# Patient Record
(demographics unavailable — no encounter records)

---

## 2025-03-18 NOTE — ASSESSMENT
[FreeTextEntry1] : 64 yr old male patient with a PMHx of choledochal cyst which was being monitored by Dr Bolton presented for follow up evaluation. Patient has no new complaints at this time, weight not stable since stopping tirzepatide.  Patient usually follows up with EGD/EUS to assess stability of cyst.   1. Choledochal cyst:  - Schedule EGD/EUS for evaluation of Choledochal cyst for growth - discussed r/b/a/i  - Follow up post procedure  - obtain record from Dr Bolton's clinic  - hold tirzepatide for 2 weeks before EUS.  - preop labs ordered

## 2025-03-18 NOTE — PHYSICAL EXAM
[Normal] : heart rate was normal and rhythm regular, normal S1 and S2, no murmurs [Bowel Sounds] : normal bowel sounds [Abdomen Tenderness] : non-tender [No Masses] : no abdominal mass palpated [Abdomen Soft] : soft [Oriented To Time, Place, And Person] : oriented to person, place, and time

## 2025-03-18 NOTE — HISTORY OF PRESENT ILLNESS
[FreeTextEntry1] : 64 year old male patient with a PMHx of HIV+, DM, HTN and HLD presented to establish care regarding follow up of his known history of choledochal cyst. Patient noted that for years he has followed up with Dr Bolton regarding a choledochal cyst that has been stable in growth and has not had any nodularity formation. Patient denies abdominal pain, jaundice or fevers. Patient last EUS and MRI were stable in size and pathology.   Patient takes Tirzepatide for DM and wt loss, stopped it 2 weeks ago and gained 10  lbs.   Colonoscopy screening 2 years ago - reported normal.  PCP dr Myron Goldberg [de-identified] : Stable choledochal cyst